# Patient Record
Sex: FEMALE
[De-identification: names, ages, dates, MRNs, and addresses within clinical notes are randomized per-mention and may not be internally consistent; named-entity substitution may affect disease eponyms.]

---

## 2017-02-15 PROBLEM — Z00.00 ENCOUNTER FOR PREVENTIVE HEALTH EXAMINATION: Status: ACTIVE | Noted: 2017-02-15

## 2024-08-01 ENCOUNTER — APPOINTMENT (OUTPATIENT)
Dept: VASCULAR SURGERY | Facility: CLINIC | Age: 44
End: 2024-08-01
Payer: SELF-PAY

## 2024-08-01 VITALS
HEIGHT: 69 IN | WEIGHT: 130 LBS | HEART RATE: 62 BPM | DIASTOLIC BLOOD PRESSURE: 60 MMHG | SYSTOLIC BLOOD PRESSURE: 96 MMHG | BODY MASS INDEX: 19.26 KG/M2

## 2024-08-01 DIAGNOSIS — Z87.891 PERSONAL HISTORY OF NICOTINE DEPENDENCE: ICD-10-CM

## 2024-08-01 DIAGNOSIS — Z86.79 PERSONAL HISTORY OF OTHER DISEASES OF THE CIRCULATORY SYSTEM: ICD-10-CM

## 2024-08-01 DIAGNOSIS — R60.0 LOCALIZED EDEMA: ICD-10-CM

## 2024-08-01 PROCEDURE — 99204 OFFICE O/P NEW MOD 45 MIN: CPT

## 2024-08-01 PROCEDURE — 93971 EXTREMITY STUDY: CPT

## 2024-08-01 RX ORDER — CITALOPRAM 10 MG/1
10 TABLET, FILM COATED ORAL
Refills: 0 | Status: ACTIVE | COMMUNITY

## 2024-08-01 RX ORDER — ISONIAZID 300 MG/1
TABLET ORAL
Refills: 0 | Status: ACTIVE | COMMUNITY

## 2024-08-01 NOTE — REVIEW OF SYSTEMS
[Limb Swelling] : limb swelling [Negative] : Heme/Lymph [As Noted in HPI] : as noted in HPI [Lower Ext Edema] : lower extremity edema

## 2024-08-02 NOTE — PROCEDURE
[FreeTextEntry1] : LLE Venous doppler was done in the office that demonstrated no evidence of DVT/SVT, no reflux, patent iliac veins

## 2024-08-02 NOTE — ASSESSMENT
[FreeTextEntry1] : 43yoF, former smoker with PMHx of MVP referred by Dr. Live Arriaga to be evaluated for LLE edema. Patient states that since May she has been experiencing on and off LLE edema and bilateral ankles edema.  She denies pain, varicose veins, prior history of DVT/SVT, trauma to her leg. on exam, both legs are well perfused, mild LLE edema noted, no calf tenderness, no skin changes. Palpable peripheral pulses throughout. LLE Venous doppler was done in the office that demonstrated no evidence of DVT/SVT, no reflux, patent iliac veins. We discussed the findings and explained that no vascular issues at this time. Recommended to wear knee high 20-30mmHg compression stockings. She may f/u as needed.

## 2024-08-02 NOTE — ADDENDUM
[FreeTextEntry1] : This note was written by Priti TIWARI, acting as a scribe for Dr. Rajinder Wilson.  I, Dr. Rajinder Wilson, have read and attest that all the information, medical decision-making, and discharge instructions within are true and accurate.  Ms. Phyllis Guardado is a 44F w/ mitral valve prolapse who was referred for BLE (LLE>RLE swelling). She had a recent venous duplex US that was negative for DVT. On exam, she does have mild LLE>RLE swelling mostly in distal calf/foot. We did a repeat venous duplex US which confirmed no significant DVT/SVT or reflux. Nonetheless she likely has some component of chronic venous insufficiency. We also look at her iliac vein and did not see signs of obvious compression/May Thurner's. We agreed on trial of medical grade compression stockings, which we prescribed her, for 3 months at least. If she still has leg swelling (particularly unilateral), can consider future CT venogram abdomen and pelvis to better elucidate if any compression of her iliac veins, but my suspicion at this time is low based on duplex and physical exam (her swelling isn't really in the thigh).   I, Dr. Rajinder Wilson, personally performed the evaluation and management (E/M) services for this new patient.  That E/M includes conducting the initial examination, assessing all conditions, and establishing the plan of care.  Today, my ACP, Priti TIWARI, was here to observe my evaluation and management services for this patient to be followed going forward.

## 2024-08-02 NOTE — HISTORY OF PRESENT ILLNESS
[FreeTextEntry1] : 43yoF, former smoker with PMHx of MVP referred by Dr. Live Arriaga to be evaluated for LLE edema. Patient states that since May she has been experiencing on and off LLE edema and bilateral ankles edema. She had venous doppler at [facility] on 7/16/24 that was negative for DVT. She denies pain, varicose veins, prior history of DVT/SVT, trauma to her leg. She denies claudication or difficulty ambulating.

## 2024-08-02 NOTE — ADDENDUM
[FreeTextEntry1] : This note was written by Priti TWIARI, acting as a scribe for Dr. Rajinder Wilson.  I, Dr. Rajinder Wilson, have read and attest that all the information, medical decision-making, and discharge instructions within are true and accurate.  Ms. Phyllis Guardado is a 44F w/ mitral valve prolapse who was referred for BLE (LLE>RLE swelling). She had a recent venous duplex US that was negative for DVT. On exam, she does have mild LLE>RLE swelling mostly in distal calf/foot. We did a repeat venous duplex US which confirmed no significant DVT/SVT or reflux. Nonetheless she likely has some component of chronic venous insufficiency. We also look at her iliac vein and did not see signs of obvious compression/May Thurner's. We agreed on trial of medical grade compression stockings, which we prescribed her, for 3 months at least. If she still has leg swelling (particularly unilateral), can consider future CT venogram abdomen and pelvis to better elucidate if any compression of her iliac veins, but my suspicion at this time is low based on duplex and physical exam (her swelling isn't really in the thigh).   I, Dr. Rajinder Wilson, personally performed the evaluation and management (E/M) services for this new patient.  That E/M includes conducting the initial examination, assessing all conditions, and establishing the plan of care.  Today, my ACP, Priti TIWARI, was here to observe my evaluation and management services for this patient to be followed going forward.

## 2024-08-02 NOTE — PHYSICAL EXAM
[Respiratory Effort] : normal respiratory effort [Normal Heart Sounds] : normal heart sounds [Alert] : alert [Calm] : calm [2+] : left 2+ [Ankle Swelling (On Exam)] : present [Ankle Swelling On The Left] : of the left ankle [Ankle Swelling On The Right] : mild [Varicose Veins Of Lower Extremities] : not present [] : not present [Abdomen Tenderness] : ~T ~M No abdominal tenderness [de-identified] : WN/WD, NAD [de-identified] : NC/AT [de-identified] : FROM [de-identified] : grossly intact

## 2024-08-02 NOTE — PHYSICAL EXAM
[Respiratory Effort] : normal respiratory effort [Normal Heart Sounds] : normal heart sounds [Alert] : alert [Calm] : calm [2+] : left 2+ [Ankle Swelling (On Exam)] : present [Ankle Swelling On The Left] : of the left ankle [Ankle Swelling On The Right] : mild [Varicose Veins Of Lower Extremities] : not present [] : not present [Abdomen Tenderness] : ~T ~M No abdominal tenderness [de-identified] : WN/WD, NAD [de-identified] : NC/AT [de-identified] : FROM [de-identified] : grossly intact

## 2024-08-02 NOTE — PHYSICAL EXAM
[Respiratory Effort] : normal respiratory effort [Normal Heart Sounds] : normal heart sounds [Alert] : alert [Calm] : calm [2+] : left 2+ [Ankle Swelling (On Exam)] : present [Ankle Swelling On The Left] : of the left ankle [Ankle Swelling On The Right] : mild [Varicose Veins Of Lower Extremities] : not present [] : not present [Abdomen Tenderness] : ~T ~M No abdominal tenderness [de-identified] : WN/WD, NAD [de-identified] : NC/AT [de-identified] : FROM [de-identified] : grossly intact

## 2024-08-02 NOTE — HISTORY OF PRESENT ILLNESS
[FreeTextEntry1] : 43yoF, former smoker with PMHx of MVP referred by Dr. Live Arriaga to be evaluated for LLE edema. Patient states that since May she has been experiencing on and off LLE edema and bilateral ankles edema. She had venous doppler at [facility] on 7/16/24 that was negative for DVT. She denies pain, varicose veins, prior history of DVT/SVT, trauma to her leg. She denies claudication or difficulty ambulating.